# Patient Record
(demographics unavailable — no encounter records)

---

## 2024-11-14 NOTE — ASSESSMENT
[Educated Patient & Family about Diagnosis] : educated the patient and family about the diagnosis [FreeTextEntry1] : Hematochezia associated with functional constipation and a healing anal fissure REC: 1. Continue to soften stool with daily Miralax 2. To eventually manage the problem without Miralax, pt will need to increase dietary fluids and fiber, or switch to an OTC fiber supplement; information packet for a high fiber diet provided 3. No additional diagnostic or therapeutic interventions indicated unless pt develops bloody diarrhea (r/o C diff) or has recurrent visible brght red blood pr despite soft comfortable stools

## 2024-11-14 NOTE — PHYSICAL EXAM
[Well Developed] : well developed [NAD] : in no acute distress [Pallor] : no pallor [Adipose Appearing] : adipose appearing [Moist & Pink Mucous Membranes] : moist and pink mucous membranes [Soft] : soft  [Distended] : non distended [Tender] : non tender [No Back Lesion] : no back lesion [Normal Position] : normal position [Tags] : no skin tags  [Fissure] : anal fissures [Hemorrhoids] : no hemorrhoids [de-identified] : Unable to palpate or percuss

## 2024-11-14 NOTE — HISTORY OF PRESENT ILLNESS
[de-identified] : 15 yo boy brought for an evaluation of a few days of passing bright red blood pr. Problem started a week ago when pt passes a solid stool and blood. Usual stooling pattern has been to pass a moderate to large solid stool q3 days. In the ER his blood count was wnl, pt was presumed to have an anl fissure and treatment with Miralax was started. Over the course of the week while taking Miralax qd the stools have become softer, daily and the visible blood has gradually disappeared. Mother notes that the pt has recently been more sedentary than usual after foot surgery, and that he required meloxicam, ketorolac and clindamycin post-op, She is clear that there has not been any diarrhea or other systemic symptoms. Pt followed by Hepatology for autoimmune hepatitis, controlled with azathioprine 100 mg qd with thiopurine metabolites in the target range.
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
## 2024-11-22 NOTE — PHYSICAL EXAM
[FreeTextEntry1] : 14-year-old child. Awake, alert, in no acute distress.  Eyes are clear with no sclera abnormalities. External ears, nose and mouth are clear.  Good respiratory effort with no audible wheezing without use of a stethoscope. Arrives to appointment in wheelchair.   The head and shoulders are level over the pelvis. No asymmetry of back structures when standing. Forward bend does not reveal any asymmetry.  No pain with palpation of the spinous processes. Patient is able to ROM back forward, backward, side to side, with no discomfort with ROM.

## 2024-11-22 NOTE — DATA REVIEWED
[de-identified] : My interpretation and review of images taken today, 11/21/2024, in office:  AP/Lateral lumbar spine XR laying down. Evidence of osteopenia. No evidence of spondylolisthesis. Stable from previous imaging.  My interpretation and review of images taken 08/27/2024, in office: AP/Lateral lumbar spine XR laying down. Evidence of osteopenia. No evidence of spondylolisthesis. Stable from previous imaging.  My interpretation and review of images taken 04/18/2024, in office: AP/Lateral lumbar spine XR laying down - Evidence of osteopenia. No evidence of spondylolisthesis. Stable from previous imaging.   My interpretation and review of images taken today, 01/18/2024, in office: AP/Lateral lumbar spine XR laying down - Evidence of osteopenia. No evidence of spondylolisthesis.   My interpretation and review of images taken 10/31/23, in office:  Evidence of osteopenia. No evidence of spondylolisthesis.   My interpretation and review of images taken , 08/30/2023, in office:  Stable, unchanged from prior. No evidence of spondylolisthesis.   My interpretation and review of images taken 07/05/2023, in office: Lumbar spine AP/Lat/Obl unchanged from previous.   MRI entire spine 2/15/23:  Asymmetric signal within the right L5 pars and pedicle, slightly more conspicuous on the current MRI, and asymmetric with the left that raises greatest concern for pars stress related injury. Such findings may reflect asymmetric marrow; however, in the setting of the reported trauma, the findings raise concern for chronic sequela of pars stress related injury. No clearly defined spondylolytic plane, distraction, displacement or associated listhesis is seen. Mild left paramedian L4-5 disc bulge.

## 2024-11-22 NOTE — DATA REVIEWED
[de-identified] : My interpretation and review of images taken today, 11/21/2024, in office:  AP/Lateral lumbar spine XR laying down. Evidence of osteopenia. No evidence of spondylolisthesis. Stable from previous imaging.  My interpretation and review of images taken 08/27/2024, in office: AP/Lateral lumbar spine XR laying down. Evidence of osteopenia. No evidence of spondylolisthesis. Stable from previous imaging.  My interpretation and review of images taken 04/18/2024, in office: AP/Lateral lumbar spine XR laying down - Evidence of osteopenia. No evidence of spondylolisthesis. Stable from previous imaging.   My interpretation and review of images taken today, 01/18/2024, in office: AP/Lateral lumbar spine XR laying down - Evidence of osteopenia. No evidence of spondylolisthesis.   My interpretation and review of images taken 10/31/23, in office:  Evidence of osteopenia. No evidence of spondylolisthesis.   My interpretation and review of images taken , 08/30/2023, in office:  Stable, unchanged from prior. No evidence of spondylolisthesis.   My interpretation and review of images taken 07/05/2023, in office: Lumbar spine AP/Lat/Obl unchanged from previous.   MRI entire spine 2/15/23:  Asymmetric signal within the right L5 pars and pedicle, slightly more conspicuous on the current MRI, and asymmetric with the left that raises greatest concern for pars stress related injury. Such findings may reflect asymmetric marrow; however, in the setting of the reported trauma, the findings raise concern for chronic sequela of pars stress related injury. No clearly defined spondylolytic plane, distraction, displacement or associated listhesis is seen. Mild left paramedian L4-5 disc bulge.
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
## 2025-02-25 NOTE — REASON FOR VISIT
[Follow Up] : a follow up visit [FreeTextEntry1] : Amplified musculoskeletal pain syndrome [Patient] : patient [Mother] : mother

## 2025-02-25 NOTE — END OF VISIT
[] : Resident [FreeTextEntry3] : I, Gio Shannon MD, personally saw and evaluated the patient and developed the plan as documented above. I concur or have edited the note as appropriate. [Time Spent: ___ minutes] : I have spent [unfilled] minutes of time on the encounter which excludes teaching and separately reported services.

## 2025-02-25 NOTE — DATA REVIEWED
[de-identified] : My interpretation and review of images taken today, 2/25/2025, in office:  AP/Lateral lumbar spine XR laying down. Evidence of osteopenia. No evidence of spondylolisthesis. Stable from previous imaging.  My interpretation and review of images taken today, 11/21/2024, in office:  AP/Lateral lumbar spine XR laying down. Evidence of osteopenia. No evidence of spondylolisthesis. Stable from previous imaging.  My interpretation and review of images taken 08/27/2024, in office: AP/Lateral lumbar spine XR laying down. Evidence of osteopenia. No evidence of spondylolisthesis. Stable from previous imaging.  My interpretation and review of images taken 04/18/2024, in office: AP/Lateral lumbar spine XR laying down - Evidence of osteopenia. No evidence of spondylolisthesis. Stable from previous imaging.   My interpretation and review of images taken today, 01/18/2024, in office: AP/Lateral lumbar spine XR laying down - Evidence of osteopenia. No evidence of spondylolisthesis.   My interpretation and review of images taken 10/31/23, in office:  Evidence of osteopenia. No evidence of spondylolisthesis.   My interpretation and review of images taken , 08/30/2023, in office:  Stable, unchanged from prior. No evidence of spondylolisthesis.   My interpretation and review of images taken 07/05/2023, in office: Lumbar spine AP/Lat/Obl unchanged from previous.   MRI entire spine 2/15/23:  Asymmetric signal within the right L5 pars and pedicle, slightly more conspicuous on the current MRI, and asymmetric with the left that raises greatest concern for pars stress related injury. Such findings may reflect asymmetric marrow; however, in the setting of the reported trauma, the findings raise concern for chronic sequela of pars stress related injury. No clearly defined spondylolytic plane, distraction, displacement or associated listhesis is seen. Mild left paramedian L4-5 disc bulge.

## 2025-02-25 NOTE — ASSESSMENT
[FreeTextEntry1] : Bartolome is a 14-year-old male with chronic back, neck and leg pain as well as lower extremity weakness due to injury sustained on 10/21/21. Diffuse Osteopenia.  The history was obtained today from the child and parent; given the patient's age, the history was unreliable, and the parent was used as an independent historian.  The child continues to complain of significant back pain and lacks the ability to sit for prolonged periods of time.  He requires periods of rest for his pain. As he develops the ability to tolerate longer periods of sitting for educational purposes, he will consider returning to the school building for education.  I had a long discussion with mother that due to his issue being more pain related than orthopedic, we may have to defer recommendations for school participate to our colleagues in pain. Pt unable to continue treatment with Dr. Sloan at this time. Pt's mother to begin setting up appointments at a specialist hospital in [place] with Dr. Wells for further care. On XRS of the lumbar spine performed and reviewed today, his osteopenia appears stable from previous imaging. He should continue to try to do weight bearing and activity as tolerated, within limits of pain and comfort, was recommended.  I have recommended that the patient continue attending physical therapy sessions to address his amplified musculoskeletal pain syndrome, right-sided low back pain, and to improve his impaired gait and mobility; prescription was provided to family.  He should continue ambulation with the wheelchair as needed for his comfort; an updated prescription for his wheelchair was provided today.   Our ultimate goal is for the child to be under the care of someone who has specific expertise on the amplified musculoskeletal pain syndrome.  He is currently plateaued in terms of his recovery since he has maxed out in a number of medications and he does have some hepatic issues as well.  We will follow-up in 3 months to determine where he is at this point.
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
## 2025-05-14 NOTE — PHYSICAL EXAM
[Well Developed] : well developed [NAD] : in no acute distress [Moist & Pink Mucous Membranes] : moist and pink mucous membranes [Soft] : soft [Normal Bowel Sounds] : normal bowel sounds [Hepatomegaly ___cm BCM] : hepatomegaly [unfilled] cm BCM [Normal Tone] : normal tone [Well-Perfused] : well-perfused [Acanthosis Nigricans] : acanthosis nigricans [Interactive] : interactive [icteric] : anicteric [Respiratory Distress] : no respiratory distress  [Distended] : non distended [Tender] : non tender [Focal Deficits] : no focal deficits [Edema] : no edema [Cyanosis] : no cyanosis [Rash] : no rash [Jaundice] : no jaundice [de-identified] : + abdominal striae

## 2025-05-14 NOTE — CONSULT LETTER
[Dear  ___] : Dear  [unfilled], [Courtesy Letter:] : I had the pleasure of seeing your patient, [unfilled], in my office today. [Please see my note below.] : Please see my note below. [Consult Closing:] : Thank you very much for allowing me to participate in the care of this patient.  If you have any questions, please do not hesitate to contact me. [Sincerely,] : Sincerely, [FreeTextEntry3] : Helga Hardin DO [facility]

## 2025-05-14 NOTE — PHYSICAL EXAM
[Well Developed] : well developed [NAD] : in no acute distress [Moist & Pink Mucous Membranes] : moist and pink mucous membranes [Soft] : soft [Normal Bowel Sounds] : normal bowel sounds [Hepatomegaly ___cm BCM] : hepatomegaly [unfilled] cm BCM [Normal Tone] : normal tone [Well-Perfused] : well-perfused [Acanthosis Nigricans] : acanthosis nigricans [Interactive] : interactive [icteric] : anicteric [Respiratory Distress] : no respiratory distress  [Distended] : non distended [Tender] : non tender [Focal Deficits] : no focal deficits [Edema] : no edema [Cyanosis] : no cyanosis [Rash] : no rash [Jaundice] : no jaundice [de-identified] : + abdominal striae
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
## 2025-05-14 NOTE — CONSULT LETTER
[Dear  ___] : Dear  [unfilled], [Courtesy Letter:] : I had the pleasure of seeing your patient, [unfilled], in my office today. [Please see my note below.] : Please see my note below. [Consult Closing:] : Thank you very much for allowing me to participate in the care of this patient.  If you have any questions, please do not hesitate to contact me. [Sincerely,] : Sincerely, [FreeTextEntry3] : Helga Hardin DO [facility]

## 2025-05-14 NOTE — CONSULT LETTER
[Dear  ___] : Dear  [unfilled], [Courtesy Letter:] : I had the pleasure of seeing your patient, [unfilled], in my office today. [Please see my note below.] : Please see my note below. [Consult Closing:] : Thank you very much for allowing me to participate in the care of this patient.  If you have any questions, please do not hesitate to contact me. [Sincerely,] : Sincerely, [FreeTextEntry3] : Helga Hardin DO [facility]

## 2025-05-14 NOTE — PHYSICAL EXAM
[Well Developed] : well developed [NAD] : in no acute distress [Moist & Pink Mucous Membranes] : moist and pink mucous membranes [Soft] : soft [Normal Bowel Sounds] : normal bowel sounds [Hepatomegaly ___cm BCM] : hepatomegaly [unfilled] cm BCM [Normal Tone] : normal tone [Well-Perfused] : well-perfused [Acanthosis Nigricans] : acanthosis nigricans [Interactive] : interactive [icteric] : anicteric [Respiratory Distress] : no respiratory distress  [Distended] : non distended [Tender] : non tender [Focal Deficits] : no focal deficits [Edema] : no edema [Cyanosis] : no cyanosis [Rash] : no rash [Jaundice] : no jaundice [de-identified] : + abdominal striae WFL

## 2025-05-14 NOTE — ASSESSMENT
[Educated Patient & Family about Diagnosis] : educated the patient and family about the diagnosis [FreeTextEntry1] : 14 year old male with chronic pain related to previous trauma and autoimmune hepatitis, maintained on Imuran. Bartolome has weaned off his steroids and is compliant with Imuran daily with good drug levels and previous normalization of liver enzymes and IgG. However he has recently had a mild rise in ALT despite normal IgG levels and good drug levels, suggestive of an alternate etiology of this mild transaminitis. Given his BMI, his ethnicity, his family history (father with MASLD), and his acanthosis nigricans, the most likely etiology of his transaminitis is MASLD. However his Fibroscan done today showed minimal steatosis. In an attempt to improve his BMI and potential MASLD, we reviewed extensively the importance of healthy eating, smaller portions, low carb/sugar, and increased exercise.  Also discussed that given the presence of acanthosis nigricans and his obesity, he should have a HgbA1c level checked. Will send labs today.   Reviewed Bartolome's blood work and discussed the importance of continued compliance with Imuran daily and regular labs at this time. Rediscussed patient education regarding Imuran use and questions answered.  Plan: - Continue Imuran 100mg daily - HgbA1c today - Liver labs to be done prior to next visit - Continue recommendations as per subspecialists. Schedule Follow up with Dr. Sloan as recommended. - Follow up visit in 4 months with repeat labs and Fibroscan at that time due to rise in enzymes - Advised to call office with questions, concerns, or change in clinical status

## 2025-05-14 NOTE — END OF VISIT
[Time Spent: ___ minutes] : I have spent [unfilled] minutes of time on the encounter which excludes teaching and separately reported services. [FreeTextEntry3] :  I have spent 60 minutes of time on the encounter which excludes separately reported services.

## 2025-05-16 NOTE — DATA REVIEWED
[de-identified] : My interpretation and review of images taken today, 5/15/2025, in office:  AP/Lateral lumbar spine XR laying down. Evidence of osteopenia. No evidence of spondylolisthesis. Stable from previous imaging.  My interpretation and review of images taken today, 2/25/2025, in office:  AP/Lateral lumbar spine XR laying down. Evidence of osteopenia. No evidence of spondylolisthesis. Stable from previous imaging.  My interpretation and review of images taken today, 11/21/2024, in office:  AP/Lateral lumbar spine XR laying down. Evidence of osteopenia. No evidence of spondylolisthesis. Stable from previous imaging.  My interpretation and review of images taken 08/27/2024, in office: AP/Lateral lumbar spine XR laying down. Evidence of osteopenia. No evidence of spondylolisthesis. Stable from previous imaging.  My interpretation and review of images taken 04/18/2024, in office: AP/Lateral lumbar spine XR laying down - Evidence of osteopenia. No evidence of spondylolisthesis. Stable from previous imaging.   My interpretation and review of images taken today, 01/18/2024, in office: AP/Lateral lumbar spine XR laying down - Evidence of osteopenia. No evidence of spondylolisthesis.   My interpretation and review of images taken 10/31/23, in office:  Evidence of osteopenia. No evidence of spondylolisthesis.   My interpretation and review of images taken , 08/30/2023, in office:  Stable, unchanged from prior. No evidence of spondylolisthesis.   My interpretation and review of images taken 07/05/2023, in office: Lumbar spine AP/Lat/Obl unchanged from previous.   MRI entire spine 2/15/23:  Asymmetric signal within the right L5 pars and pedicle, slightly more conspicuous on the current MRI, and asymmetric with the left that raises greatest concern for pars stress related injury. Such findings may reflect asymmetric marrow; however, in the setting of the reported trauma, the findings raise concern for chronic sequela of pars stress related injury. No clearly defined spondylolytic plane, distraction, displacement or associated listhesis is seen. Mild left paramedian L4-5 disc bulge.

## 2025-05-16 NOTE — REASON FOR VISIT
[Follow Up] : a follow up visit [Patient] : patient [Mother] : mother [FreeTextEntry1] : Amplified musculoskeletal pain syndrome

## 2025-05-16 NOTE — DATA REVIEWED
[de-identified] : My interpretation and review of images taken today, 5/15/2025, in office:  AP/Lateral lumbar spine XR laying down. Evidence of osteopenia. No evidence of spondylolisthesis. Stable from previous imaging.  My interpretation and review of images taken today, 2/25/2025, in office:  AP/Lateral lumbar spine XR laying down. Evidence of osteopenia. No evidence of spondylolisthesis. Stable from previous imaging.  My interpretation and review of images taken today, 11/21/2024, in office:  AP/Lateral lumbar spine XR laying down. Evidence of osteopenia. No evidence of spondylolisthesis. Stable from previous imaging.  My interpretation and review of images taken 08/27/2024, in office: AP/Lateral lumbar spine XR laying down. Evidence of osteopenia. No evidence of spondylolisthesis. Stable from previous imaging.  My interpretation and review of images taken 04/18/2024, in office: AP/Lateral lumbar spine XR laying down - Evidence of osteopenia. No evidence of spondylolisthesis. Stable from previous imaging.   My interpretation and review of images taken today, 01/18/2024, in office: AP/Lateral lumbar spine XR laying down - Evidence of osteopenia. No evidence of spondylolisthesis.   My interpretation and review of images taken 10/31/23, in office:  Evidence of osteopenia. No evidence of spondylolisthesis.   My interpretation and review of images taken , 08/30/2023, in office:  Stable, unchanged from prior. No evidence of spondylolisthesis.   My interpretation and review of images taken 07/05/2023, in office: Lumbar spine AP/Lat/Obl unchanged from previous.   MRI entire spine 2/15/23:  Asymmetric signal within the right L5 pars and pedicle, slightly more conspicuous on the current MRI, and asymmetric with the left that raises greatest concern for pars stress related injury. Such findings may reflect asymmetric marrow; however, in the setting of the reported trauma, the findings raise concern for chronic sequela of pars stress related injury. No clearly defined spondylolytic plane, distraction, displacement or associated listhesis is seen. Mild left paramedian L4-5 disc bulge.

## 2025-05-16 NOTE — END OF VISIT
Problem: Impaired Activities of Daily Living  Goal: Achieve highest/safest level of independence in self care  Description  Interventions:  - Assess ability and encourage patient to participate in ADLs to maximize function  - Promote sitting position i [Time Spent: ___ minutes] : I have spent [unfilled] minutes of time on the encounter which excludes teaching and separately reported services.
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
